# Patient Record
Sex: FEMALE | URBAN - METROPOLITAN AREA
[De-identification: names, ages, dates, MRNs, and addresses within clinical notes are randomized per-mention and may not be internally consistent; named-entity substitution may affect disease eponyms.]

---

## 2024-11-21 ENCOUNTER — TELEPHONE (OUTPATIENT)
Dept: CARDIOLOGY | Facility: CLINIC | Age: 29
End: 2024-11-21
Payer: COMMERCIAL

## 2024-11-21 DIAGNOSIS — R07.89 OTHER CHEST PAIN: Primary | ICD-10-CM

## 2024-11-22 ENCOUNTER — TELEPHONE (OUTPATIENT)
Dept: CARDIOLOGY | Facility: CLINIC | Age: 29
End: 2024-11-22
Payer: COMMERCIAL

## 2024-11-22 NOTE — PROGRESS NOTES
PCP - No primary care provider on file.  Referring Physician:     Subjective:   Patient ID:  Vonnie Thurston is a 29 y.o. female who presents for evaluation and treatment of chest pain on exertion.    PMHx   Allergic rhinits    Patient referred for evaluation of chest pain during exercise. 3-4 weeks of left sided chest pain on exertion that she has not had previously. No rest symptoms. Denies SOB/LAKE, palpitations, lower extremity edema, orthopnea, nausea/vomiting, syncope, dizziness. No family history of early onset CAD. Non-smoker, no diabetes.    Evaluated by [facility] pulmonology 6/5/24 for SOB. Spirometry with bronchodilator negative in their clinic. Patient had not completed methacholine challenge for asthma workup. Follow up 8/7/24 with resolution of dyspnea after treatment of rhinitis. Patient declined methacholine challenge.    History:     Social History     Tobacco Use    Smoking status: Never     Passive exposure: Never    Smokeless tobacco: Never   Substance Use Topics    Alcohol use: Not on file     No family history on file.  Meds:   Review of patient's allergies indicates:  No Known Allergies    Current Outpatient Medications:     azelastine (ASTELIN) 137 mcg (0.1 %) nasal spray, 2 sprays by Nasal route 2 (two) times daily., Disp: , Rfl:     fluticasone propionate (FLONASE) 50 mcg/actuation nasal spray, 2 sprays by Each Nostril route 2 (two) times daily., Disp: , Rfl:     levocetirizine (XYZAL) 5 MG tablet, Take 1 tablet by mouth every evening., Disp: , Rfl:     montelukast (SINGULAIR) 10 mg tablet, Take 1 tablet by mouth every evening., Disp: , Rfl:   Review of Systems   Constitutional:  Negative for chills, diaphoresis, fever and malaise/fatigue.   HENT:  Negative for sore throat.    Eyes:  Negative for double vision.   Respiratory:  Negative for cough, shortness of breath and wheezing.    Cardiovascular:  Positive for chest pain. Negative for palpitations, orthopnea, claudication, leg swelling and  "PND.   Gastrointestinal:  Negative for abdominal pain, blood in stool, constipation, heartburn, nausea and vomiting.   Genitourinary:  Negative for hematuria.   Musculoskeletal:  Negative for falls and myalgias.   Neurological:  Negative for dizziness, loss of consciousness, weakness and headaches.   Psychiatric/Behavioral:  The patient is not nervous/anxious.      Objective:   /60   Pulse 87   Ht 5' 3" (1.6 m)   Wt 45.5 kg (100 lb 5 oz)   SpO2 97%   BMI 17.77 kg/m²   BMI Readings from Last 15 Encounters:   11/25/24 17.77 kg/m²      Physical Exam  Constitutional:       General: She is not in acute distress.  HENT:      Head: Normocephalic and atraumatic.   Eyes:      Pupils: Pupils are equal, round, and reactive to light.   Neck:      Vascular: No carotid bruit or JVD.   Cardiovascular:      Rate and Rhythm: Normal rate and regular rhythm.      Heart sounds: Normal heart sounds. No murmur heard.     No friction rub. No gallop.   Pulmonary:      Effort: Pulmonary effort is normal. No respiratory distress.      Breath sounds: Normal breath sounds. No wheezing or rales.   Chest:      Chest wall: No tenderness.   Abdominal:      General: Bowel sounds are normal. There is no distension.      Palpations: Abdomen is soft.      Tenderness: There is no abdominal tenderness.   Musculoskeletal:      Right lower leg: No edema.      Left lower leg: No edema.   Skin:     General: Skin is warm and dry.      Findings: No erythema.   Neurological:      Mental Status: She is alert and oriented to person, place, and time.   Psychiatric:         Mood and Affect: Mood and affect normal.         Cognition and Memory: Memory normal.         Judgment: Judgment normal.       Labs:     No results found for: "NA", "K", "CL", "CO2", "BUN", "CREATININE", "GLUCOSE", "ANIONGAP"  No results found for: "HGBA1C"  No results found for: "BNP", "BNPTRIAGEBLO" No results found for: "WBC", "HGB", "HCT", "PLT", "GRAN"  No results found for: " ""CHOL", "HDL", "LDLCALC", "TRIG"       Cardiovascular Imaging:     Echo: No results found for: "EF"  Stress test: No results found for this or any previous visit.    Assessment & Plan:     1. Chest pain, unspecified type      # Chest pain  -chest pain with exercise  -EKG unremarkable today  -lipid panel  -exercise stress echo#    PREVENTION   For smokers: Educated and strongly counseled on smoking cessation.   Adopt a heart healthy diet  Counseled on various heart healthy diets  -Mediterranean diet- High in fruits vegetable, grains, fish, nuts and extra-virgin olive oil. Minimize processed, salty foods, packaged foods.  -Dash diet to lower HTN-Similar to mediterranean diet, low fat dairy, meatless meals and low sodium  -Plant based diet: Foundation being minimally processed and whole foods.   -Avoid foods in saturated fats and trans fats.   Aerobic exercise 30 minutes 5 times/ week.   Reduce and limit alcohol intake.   Learn stress management / relaxation techniques such as mindfulness and meditation.   Encourage self and family members to adopt healthy choices at a young age.     COMMUNICATION   Patients are encouraged to call clinic if they have any questions or concerns.   Clinic line is not for emergency purposes.  If they are feeling unwell and especially if they have certain red flag symptoms such as new or worsening  chest pain, chest pressure, shortness of breath, palpitations, dizziness, fall (especially on anticoagulants),   low blood pressure, focal weakness, etc., they should go to the emergency room.      Case discussed with Dr. Peguero. Unless there are intervening problems, patient should return for re-evaluation as needed. Will call with results and schedule follow up if needed.    Signed:  Tee Aggarwal M.D.  Cardiovascular Fellow  [facility]   "

## 2024-11-25 ENCOUNTER — OFFICE VISIT (OUTPATIENT)
Dept: CARDIOLOGY | Facility: CLINIC | Age: 29
End: 2024-11-25
Payer: COMMERCIAL

## 2024-11-25 ENCOUNTER — HOSPITAL ENCOUNTER (OUTPATIENT)
Dept: CARDIOLOGY | Facility: CLINIC | Age: 29
Discharge: HOME | End: 2024-11-25
Payer: COMMERCIAL

## 2024-11-25 VITALS
BODY MASS INDEX: 17.77 KG/M2 | HEIGHT: 63 IN | HEART RATE: 87 BPM | SYSTOLIC BLOOD PRESSURE: 100 MMHG | WEIGHT: 100.31 LBS | DIASTOLIC BLOOD PRESSURE: 60 MMHG | OXYGEN SATURATION: 97 %

## 2024-11-25 DIAGNOSIS — R07.9 CHEST PAIN, UNSPECIFIED TYPE: Primary | ICD-10-CM

## 2024-11-25 DIAGNOSIS — R07.89 OTHER CHEST PAIN: ICD-10-CM

## 2024-11-25 PROBLEM — J31.0 CHRONIC RHINITIS: Status: ACTIVE | Noted: 2024-08-19

## 2024-11-25 PROBLEM — R09.82 POST-NASAL DRIP: Status: ACTIVE | Noted: 2024-08-19

## 2024-11-25 PROBLEM — M41.9 SCOLIOSIS OF LUMBAR SPINE: Status: ACTIVE | Noted: 2024-06-18

## 2024-11-25 LAB
OHS QRS DURATION: 82 MS
OHS QTC CALCULATION: 442 MS

## 2024-11-25 PROCEDURE — 3078F DIAST BP <80 MM HG: CPT | Mod: CPTII,S$GLB,, | Performed by: STUDENT IN AN ORGANIZED HEALTH CARE EDUCATION/TRAINING PROGRAM

## 2024-11-25 PROCEDURE — 99204 OFFICE O/P NEW MOD 45 MIN: CPT | Mod: 25,S$GLB,, | Performed by: STUDENT IN AN ORGANIZED HEALTH CARE EDUCATION/TRAINING PROGRAM

## 2024-11-25 PROCEDURE — 3074F SYST BP LT 130 MM HG: CPT | Mod: CPTII,S$GLB,, | Performed by: STUDENT IN AN ORGANIZED HEALTH CARE EDUCATION/TRAINING PROGRAM

## 2024-11-25 PROCEDURE — 3008F BODY MASS INDEX DOCD: CPT | Mod: CPTII,S$GLB,, | Performed by: STUDENT IN AN ORGANIZED HEALTH CARE EDUCATION/TRAINING PROGRAM

## 2024-11-25 PROCEDURE — 93010 ELECTROCARDIOGRAM REPORT: CPT | Mod: S$GLB,,, | Performed by: STUDENT IN AN ORGANIZED HEALTH CARE EDUCATION/TRAINING PROGRAM

## 2024-11-25 PROCEDURE — 93005 ELECTROCARDIOGRAM TRACING: CPT | Mod: S$GLB,,, | Performed by: STUDENT IN AN ORGANIZED HEALTH CARE EDUCATION/TRAINING PROGRAM

## 2024-11-25 PROCEDURE — 1159F MED LIST DOCD IN RCRD: CPT | Mod: CPTII,S$GLB,, | Performed by: STUDENT IN AN ORGANIZED HEALTH CARE EDUCATION/TRAINING PROGRAM

## 2024-11-25 PROCEDURE — 99999 PR PBB SHADOW E&M-EST. PATIENT-LVL III: CPT | Mod: PBBFAC,,, | Performed by: STUDENT IN AN ORGANIZED HEALTH CARE EDUCATION/TRAINING PROGRAM

## 2024-11-25 RX ORDER — FLUTICASONE PROPIONATE 50 MCG
2 SPRAY, SUSPENSION (ML) NASAL 2 TIMES DAILY
COMMUNITY
Start: 2024-03-27

## 2024-11-25 RX ORDER — MONTELUKAST SODIUM 10 MG/1
1 TABLET ORAL NIGHTLY
COMMUNITY
Start: 2024-06-05

## 2024-11-25 RX ORDER — AZELASTINE 1 MG/ML
2 SPRAY, METERED NASAL 2 TIMES DAILY
COMMUNITY
Start: 2024-06-05

## 2024-11-25 RX ORDER — LEVOCETIRIZINE DIHYDROCHLORIDE 5 MG/1
1 TABLET, FILM COATED ORAL NIGHTLY
COMMUNITY
Start: 2024-03-27

## 2024-12-06 ENCOUNTER — LAB VISIT (OUTPATIENT)
Dept: LAB | Facility: HOSPITAL | Age: 29
End: 2024-12-06
Payer: COMMERCIAL

## 2024-12-06 DIAGNOSIS — R07.9 CHEST PAIN, UNSPECIFIED TYPE: ICD-10-CM

## 2024-12-06 LAB
CHOLEST SERPL-MCNC: 148 MG/DL (ref 120–199)
CHOLEST/HDLC SERPL: 2.2 {RATIO} (ref 2–5)
HDLC SERPL-MCNC: 67 MG/DL (ref 40–75)
HDLC SERPL: 45.3 % (ref 20–50)
LDLC SERPL CALC-MCNC: 69 MG/DL (ref 63–159)
NONHDLC SERPL-MCNC: 81 MG/DL
TRIGL SERPL-MCNC: 60 MG/DL (ref 30–150)

## 2024-12-06 PROCEDURE — 80061 LIPID PANEL: CPT | Performed by: STUDENT IN AN ORGANIZED HEALTH CARE EDUCATION/TRAINING PROGRAM

## 2024-12-06 PROCEDURE — 36415 COLL VENOUS BLD VENIPUNCTURE: CPT | Performed by: STUDENT IN AN ORGANIZED HEALTH CARE EDUCATION/TRAINING PROGRAM

## 2025-01-08 DIAGNOSIS — R09.82 POSTNASAL DRIP: Primary | ICD-10-CM

## 2025-03-03 ENCOUNTER — OFFICE VISIT (OUTPATIENT)
Dept: OTOLARYNGOLOGY | Facility: CLINIC | Age: 30
End: 2025-03-03
Payer: COMMERCIAL

## 2025-03-03 DIAGNOSIS — K21.9 LARYNGOPHARYNGEAL REFLUX: Primary | ICD-10-CM

## 2025-03-03 PROCEDURE — 99999 PR PBB SHADOW E&M-EST. PATIENT-LVL II: CPT | Mod: PBBFAC,,, | Performed by: OTOLARYNGOLOGY

## 2025-03-03 RX ORDER — OMEPRAZOLE 40 MG/1
40 CAPSULE, DELAYED RELEASE ORAL DAILY
Qty: 30 CAPSULE | Refills: 3 | Status: SHIPPED | OUTPATIENT
Start: 2025-03-03 | End: 2026-03-03

## 2025-03-03 NOTE — PROGRESS NOTES
Ear, Nose, & Throat  Otolaryngology - Head & Neck Surgery    Summary of Visit:  Vonnie Thurston is a kind patient who was referred to me by Dr. Jana Mclaughlin in consultation for Nasal Congestion      Subjective:     Chief Complaint:   Chief Complaint   Patient presents with    Nasal Congestion       Vonnie Thurston is a 29 y.o. female who was referred to me by Dr. Jana Mclaughlin in consultation for nasal congestion and post-nasal drainage.  She reports a longstanding history of a sensation of excessive postnasal drainage which collects in her throat.  She clears her throat in vain numerous times throughout the day.  She notes no significant dysphonia or dysphagia.  She denies any midfacial pressure, pain, nasal airway obstruction.  She has been on nasal steroid sprays as well as oral antihistamine and montelukast with minimal change in symptoms.    Past Medical History  Active Ambulatory Problems     Diagnosis Date Noted    Scoliosis of lumbar spine 06/18/2024    Post-nasal drip 08/19/2024    Chronic rhinitis 08/19/2024     Resolved Ambulatory Problems     Diagnosis Date Noted    No Resolved Ambulatory Problems     No Additional Past Medical History       Past Surgical History  She has no past surgical history on file.    No past surgical history on file.     Family History  Her family history is not on file.    Social History  She reports that she has never smoked. She has never been exposed to tobacco smoke. She has never used smokeless tobacco.    Allergies  She has no known allergies.    Medications  She has a current medication list which includes the following prescription(s): azelastine, fluticasone propionate, levocetirizine, montelukast, and omeprazole.    ROS:  Pertinent positive and negative review of systems as noted in HPI.     Objective:     There were no vitals taken for this visit.   General Appearance:   Awake, Alert and Oriented. NAD. Appropriate affect and appearance      Neuro:    Spontaneous eye opening, appropriate verbal responses, follows commands  Pupils equal, round & brisk. EOMI, no proptosis, no spontaneous nystagmus  Face is symmetric, HB I, non-edematous and SILT bilaterally  Vision grossly intact, Hearing grossly intact  ARTHUR, normal tone     Head and Face:   skin is intact, facial movement symmetric     Ears:  Periauricular regions non-erythematous, non-fluctuanct non-tender  Pinna normal bilaterally, no skin lesions  EACs patent and without drainage bilaterally   Tympanic membranes are normal in appearance bilaterally.  No middle ear effusion noted bilaterally.    Nose:   External nose is symmetric, no skin lesions  Septum midline, No inferior turbinate hypertrophy, No polyps or rhinorrhea     OC/OP:  Tongue midline on extension, non-edematous, soft  No labial, buccal, oral tongue or floor of mouth lesions  Soft palate symmetric, midline and without lesions or masses, tonsils symmetric  No masses or lesions of the visualized oropharynx     Neck:  Neck is symmetric, non-edematous, non-erythematous  Trachea is midline and easily palpable,  No palpable adenopathy or masses in levels I-VI     Glands:  Parotid and submandibular glands are symmetric and non-tender.   No thyroid nodules or masses, non-tender      Respiratory:  Normal work of breathing, no accessory muscle use, no stridor     Voice:  Normal vocal quality, volume, prosody and articulation   Data Review:       Procedures:     Procedure: Flexible Laryngoscopy    Date: 03/03/2025     Pre procedure Diagnosis: Post-nasal drip/fullness    Post procedure Diagnosis: same    Equipment Used: Distal Chip Video Laryngoscope    Anesthesia: Topical 4% Lidocaine and Adalberto-synephrine    Nasal Side: right    Procedure:  After verbal consent was obtained, the nose was sprayed with topical anesthetic and decongestant. A flexible laryngoscope was inserted through the nose to the level of the larynx. Findings listed and photodocumentation  listed below    Nasopharynx: Non-obstructive, symmetric adenoid tissue. Eustachian tubes orifices patent. Symmetric soft palate elevation  Oropharynx: Symmetric base of tongue, No concerning lesions or mass of valleculae, posterior pharynx, base of tongue or tonsils  Hypopharynx: No concerning lesions/masses of pyriform sinuses. No pooling secretions.   Epiglottis: Crisp  Larynx: True vocal folds mobile bilaterally, no masses or lesions of the endolarynx, moderate postcricoid edema and erythema, subglottis adequately visualized. No signs of stenosis or masses.     Outcome: The patient tolerated the procedure well and without complaint.     Assessment:     Laryngopharyngeal reflux    Plan:     I had a long discussion with the patient regarding her condition and the further workup and management options.  Her symptoms and physical exam findings are consistent with laryngopharyngeal reflux.  We will place her on omeprazole consistently for the next 6 weeks.  Reflux precautions were also discussed with her.  Follow up at that time for reassessment.    No orders of the defined types were placed in this encounter.     Medications Ordered This Encounter   Medications    omeprazole (PRILOSEC) 40 MG capsule      Problem List Items Addressed This Visit    None      Answers submitted by the patient for this visit:  Review of Symptoms Questionnaire  (Submitted on 3/3/2025)  None of these: Yes  postnasal drip: Yes  Eye Drainage?: Yes  eye itching: Yes  None of these: Yes  chest pain: Yes  None of these: Yes  None of these: Yes  None of these: Yes  None of these : Yes  Seasonal Allergies?: Yes  None of these : Yes  dizziness: Yes  None of these: Yes  None of these: Yes

## 2025-04-17 ENCOUNTER — OFFICE VISIT (OUTPATIENT)
Dept: OTOLARYNGOLOGY | Facility: CLINIC | Age: 30
End: 2025-04-17
Payer: COMMERCIAL

## 2025-04-17 DIAGNOSIS — K21.9 LARYNGOPHARYNGEAL REFLUX: Primary | ICD-10-CM

## 2025-04-17 NOTE — PROGRESS NOTES
Ear, Nose, & Throat  Otolaryngology - Head & Neck Surgery    Summary of Visit:  Vonnie Thurston was seen in follow-up for No chief complaint on file.      Subjective:     Chief Complaint: No chief complaint on file.      Vonnie Thurston is a 30 y.o. female who returns to clinic for follow up.  She has been taking the PPI therapy consistently in his noted near-complete resolution of her throat symptoms.  They continue to present intermittently but overall she is quite pleased with her improvement.  She has no new symptoms to report.    Past Medical History  Active Ambulatory Problems     Diagnosis Date Noted    Scoliosis of lumbar spine 06/18/2024    Post-nasal drip 08/19/2024    Chronic rhinitis 08/19/2024     Resolved Ambulatory Problems     Diagnosis Date Noted    No Resolved Ambulatory Problems     No Additional Past Medical History       Past Surgical History  She has no past surgical history on file.    No past surgical history on file.     Family History  Her family history is not on file.    Social History  She reports that she has never smoked. She has never been exposed to tobacco smoke. She has never used smokeless tobacco.    Allergies  She has no known allergies.    Medications  She has a current medication list which includes the following prescription(s): azelastine, fluticasone propionate, levocetirizine, montelukast, and omeprazole.    ROS:  Pertinent positive and negative review of systems as noted in HPI.     Objective:     There were no vitals taken for this visit.   General Appearance:   Awake, Alert and Oriented. NAD. Appropriate affect and appearance      Neuro:   Spontaneous eye opening, appropriate verbal responses, follows commands  Pupils equal, round & brisk. EOMI, no proptosis,  Face is symmetric, HB I, non-edematous bilaterally       Head and Face:   Skin is intact with no lesions noted.  Parotid and submandibular glands are symmetric and non-tender.      Ears:  Periauricular regions  non-erythematous, non-fluctuanct, and non-tender  Pinna normal bilaterally, no skin lesions  EACs patent and without drainage bilaterally   Tympanic membranes are normal in appearance bilaterally.  No middle ear effusion noted bilaterally.    Nose:   External nose is symmetric, no skin lesions  Septum midline, No inferior turbinate hypertrophy, No polyps or rhinorrhea     OC/OP:  Tongue midline on extension, non-edematous, soft  No labial, buccal, oral tongue or floor of mouth lesions  Soft palate symmetric, midline and without lesions or masses, tonsils symmetric  No masses or lesions of the visualized oropharynx     Neck:  Neck is symmetric, non-edematous, non-erythematous  Trachea is midline and easily palpable,  No palpable adenopathy or masses in levels I-VI  No thyroid nodules or masses, non-tender      Respiratory:  Normal work of breathing, no accessory muscle use, no stridor     Voice:  Normal vocal quality, volume and articulation    Data Review:   LABS    IMAGING    AUDIO    \    Procedures:       Assessment:     Laryngopharyngeal reflux    Plan:     I had a long discussion with the patient regarding her condition and the further workup and management options.  She is encouraged to continue the PPI therapy for another 6 weeks.  I suggested at that point that she stop it and then we follow up 3-4 weeks after that point to assess whether any additional therapy is warranted.  If she has recurrence of symptoms, we will place her on an H2 blocker for longer-term use.    No orders of the defined types were placed in this encounter.         Problem List Items Addressed This Visit    None

## 2025-06-17 ENCOUNTER — OFFICE VISIT (OUTPATIENT)
Dept: OTOLARYNGOLOGY | Facility: CLINIC | Age: 30
End: 2025-06-17
Payer: COMMERCIAL

## 2025-06-17 DIAGNOSIS — K21.9 LARYNGOPHARYNGEAL REFLUX: Primary | ICD-10-CM

## 2025-06-17 PROCEDURE — 99213 OFFICE O/P EST LOW 20 MIN: CPT | Mod: 25,S$GLB,, | Performed by: OTOLARYNGOLOGY

## 2025-06-17 PROCEDURE — 31575 DIAGNOSTIC LARYNGOSCOPY: CPT | Mod: S$GLB,,, | Performed by: OTOLARYNGOLOGY

## 2025-06-17 PROCEDURE — 99999 PR PBB SHADOW E&M-EST. PATIENT-LVL II: CPT | Mod: PBBFAC,,, | Performed by: OTOLARYNGOLOGY

## 2025-06-17 PROCEDURE — 1159F MED LIST DOCD IN RCRD: CPT | Mod: CPTII,S$GLB,, | Performed by: OTOLARYNGOLOGY

## 2025-06-17 RX ORDER — FAMOTIDINE 40 MG/1
40 TABLET, FILM COATED ORAL DAILY
Qty: 90 TABLET | Refills: 3 | Status: SHIPPED | OUTPATIENT
Start: 2025-06-17 | End: 2026-06-17

## 2025-06-18 NOTE — PROGRESS NOTES
Ear, Nose, & Throat  Otolaryngology - Head & Neck Surgery    Summary of Visit:  Vonnie Thurston was seen in follow-up for No chief complaint on file.      Subjective:     Chief Complaint: No chief complaint on file.      Vonnie Thurston is a 30 y.o. female who returns to clinic for follow up.  She has been doing well since last visit.  She stopped the omeprazole and noted a slight increase in throat discomfort.  She denies any new symptoms at this point    Past Medical History  Active Ambulatory Problems     Diagnosis Date Noted    Scoliosis of lumbar spine 06/18/2024    Post-nasal drip 08/19/2024    Chronic rhinitis 08/19/2024     Resolved Ambulatory Problems     Diagnosis Date Noted    No Resolved Ambulatory Problems     No Additional Past Medical History       Past Surgical History  She has no past surgical history on file.    No past surgical history on file.     Family History  Her family history is not on file.    Social History  She reports that she has never smoked. She has never been exposed to tobacco smoke. She has never used smokeless tobacco.    Allergies  She has no known allergies.    Medications  She has a current medication list which includes the following prescription(s): azelastine, famotidine, fluticasone propionate, levocetirizine, montelukast, and omeprazole.    ROS:  Pertinent positive and negative review of systems as noted in HPI.     Objective:     There were no vitals taken for this visit.   General Appearance:   Awake, Alert and Oriented. NAD. Appropriate affect and appearance      Neuro:   Spontaneous eye opening, appropriate verbal responses, follows commands  Pupils equal, round & brisk. EOMI, no proptosis,  Face is symmetric, HB I, non-edematous bilaterally       Head and Face:   Skin is intact with no lesions noted.  Parotid and submandibular glands are symmetric and non-tender.      Ears:  Periauricular regions non-erythematous, non-fluctuanct, and non-tender  Pinna normal bilaterally,  no skin lesions  EACs patent and without drainage bilaterally   Tympanic membranes are normal in appearance bilaterally.  No middle ear effusion noted bilaterally.    Nose:   External nose is symmetric, no skin lesions  Septum midline, No inferior turbinate hypertrophy, No polyps or rhinorrhea     OC/OP:  Tongue midline on extension, non-edematous, soft  No labial, buccal, oral tongue or floor of mouth lesions  Soft palate symmetric, midline and without lesions or masses, tonsils symmetric  No masses or lesions of the visualized oropharynx     Neck:  Neck is symmetric, non-edematous, non-erythematous  Trachea is midline and easily palpable,  No palpable adenopathy or masses in levels I-VI  No thyroid nodules or masses, non-tender      Respiratory:  Normal work of breathing, no accessory muscle use, no stridor     Voice:  Normal vocal quality, volume and articulation    Data Review:   LABS    IMAGING    AUDIO        Procedures:     Procedure: Flexible Laryngoscopy    Date: 06/18/2025     Pre procedure Diagnosis: Dysphonia    Post procedure Diagnosis: same    Equipment Used: Distal Chip Video Laryngoscope    Anesthesia: Topical 4% Lidocaine and Adalberto-synephrine    Nasal Side: right    Procedure:  After verbal consent was obtained, the nose was sprayed with topical anesthetic and decongestant. A flexible laryngoscope was inserted through the nose to the level of the larynx. Findings listed and photodocumentation listed below    Nasopharynx: Non-obstructive, symmetric adenoid tissue. Eustachian tubes orifices patent. Symmetric soft palate elevation  Oropharynx: Symmetric base of tongue, No concerning lesions or mass of valleculae, posterior pharynx, base of tongue or tonsils  Hypopharynx: No concerning lesions/masses of pyriform sinuses. No pooling secretions.   Epiglottis: Crisp  Larynx: True vocal folds mobile bilaterally, no masses or lesions of the endolarynx, mild interarytenoid erythema, subglottis adequately  visualized. No signs of stenosis or masses.     Outcome: The patient tolerated the procedure well and without complaint.   Assessment:     1. Laryngopharyngeal reflux        Plan:     I had a long discussion with the patient regarding her condition and the further workup and management options.  She continues to have some findings consistent with laryngopharyngeal reflux despite aggressive PPI use.  Symptoms are slightly worse since attempting to discontinue the medication.  We will place her on Pepcid at this point.  Follow up on an as-needed basis    No orders of the defined types were placed in this encounter.     Medications Ordered This Encounter   Medications    famotidine (PEPCID) 40 MG tablet      Problem List Items Addressed This Visit    None